# Patient Record
Sex: MALE | Race: WHITE | NOT HISPANIC OR LATINO | URBAN - METROPOLITAN AREA
[De-identification: names, ages, dates, MRNs, and addresses within clinical notes are randomized per-mention and may not be internally consistent; named-entity substitution may affect disease eponyms.]

---

## 2024-02-06 ENCOUNTER — EMERGENCY (EMERGENCY)
Facility: HOSPITAL | Age: 34
LOS: 1 days | Discharge: HOME | End: 2024-02-06
Attending: STUDENT IN AN ORGANIZED HEALTH CARE EDUCATION/TRAINING PROGRAM | Admitting: STUDENT IN AN ORGANIZED HEALTH CARE EDUCATION/TRAINING PROGRAM
Payer: COMMERCIAL

## 2024-02-06 VITALS
WEIGHT: 160.06 LBS | HEART RATE: 98 BPM | SYSTOLIC BLOOD PRESSURE: 145 MMHG | OXYGEN SATURATION: 98 % | DIASTOLIC BLOOD PRESSURE: 89 MMHG | TEMPERATURE: 98 F | RESPIRATION RATE: 20 BRPM | HEIGHT: 72 IN

## 2024-02-06 DIAGNOSIS — N50.82 SCROTAL PAIN: ICD-10-CM

## 2024-02-06 DIAGNOSIS — F17.290 NICOTINE DEPENDENCE, OTHER TOBACCO PRODUCT, UNCOMPLICATED: ICD-10-CM

## 2024-02-06 DIAGNOSIS — S30.853A: ICD-10-CM

## 2024-02-06 LAB
ANION GAP SERPL CALC-SCNC: 10 MMOL/L — SIGNIFICANT CHANGE UP (ref 5–17)
APTT BLD: 29.9 SEC — SIGNIFICANT CHANGE UP (ref 24.5–35.6)
BASOPHILS # BLD AUTO: 0.04 K/UL — SIGNIFICANT CHANGE UP (ref 0–0.2)
BASOPHILS NFR BLD AUTO: 0.3 % — SIGNIFICANT CHANGE UP (ref 0–2)
BUN SERPL-MCNC: 14 MG/DL — SIGNIFICANT CHANGE UP (ref 7–23)
CALCIUM SERPL-MCNC: 8.2 MG/DL — LOW (ref 8.4–10.5)
CHLORIDE SERPL-SCNC: 103 MMOL/L — SIGNIFICANT CHANGE UP (ref 96–108)
CO2 SERPL-SCNC: 27 MMOL/L — SIGNIFICANT CHANGE UP (ref 22–31)
CREAT SERPL-MCNC: 0.79 MG/DL — SIGNIFICANT CHANGE UP (ref 0.5–1.3)
EGFR: 119 ML/MIN/1.73M2 — SIGNIFICANT CHANGE UP
EOSINOPHIL # BLD AUTO: 0.14 K/UL — SIGNIFICANT CHANGE UP (ref 0–0.5)
EOSINOPHIL NFR BLD AUTO: 1.2 % — SIGNIFICANT CHANGE UP (ref 0–6)
GLUCOSE SERPL-MCNC: 85 MG/DL — SIGNIFICANT CHANGE UP (ref 70–99)
HCT VFR BLD CALC: 44.2 % — SIGNIFICANT CHANGE UP (ref 39–50)
HGB BLD-MCNC: 15.3 G/DL — SIGNIFICANT CHANGE UP (ref 13–17)
IMM GRANULOCYTES NFR BLD AUTO: 0.4 % — SIGNIFICANT CHANGE UP (ref 0–0.9)
INR BLD: 0.92 — SIGNIFICANT CHANGE UP (ref 0.85–1.18)
LYMPHOCYTES # BLD AUTO: 2.54 K/UL — SIGNIFICANT CHANGE UP (ref 1–3.3)
LYMPHOCYTES # BLD AUTO: 22 % — SIGNIFICANT CHANGE UP (ref 13–44)
MCHC RBC-ENTMCNC: 30.8 PG — SIGNIFICANT CHANGE UP (ref 27–34)
MCHC RBC-ENTMCNC: 34.6 GM/DL — SIGNIFICANT CHANGE UP (ref 32–36)
MCV RBC AUTO: 89.1 FL — SIGNIFICANT CHANGE UP (ref 80–100)
MONOCYTES # BLD AUTO: 0.5 K/UL — SIGNIFICANT CHANGE UP (ref 0–0.9)
MONOCYTES NFR BLD AUTO: 4.3 % — SIGNIFICANT CHANGE UP (ref 2–14)
NEUTROPHILS # BLD AUTO: 8.26 K/UL — HIGH (ref 1.8–7.4)
NEUTROPHILS NFR BLD AUTO: 71.8 % — SIGNIFICANT CHANGE UP (ref 43–77)
NRBC # BLD: 0 /100 WBCS — SIGNIFICANT CHANGE UP (ref 0–0)
PLATELET # BLD AUTO: 286 K/UL — SIGNIFICANT CHANGE UP (ref 150–400)
POTASSIUM SERPL-MCNC: 4.2 MMOL/L — SIGNIFICANT CHANGE UP (ref 3.5–5.3)
POTASSIUM SERPL-SCNC: 4.2 MMOL/L — SIGNIFICANT CHANGE UP (ref 3.5–5.3)
PROTHROM AB SERPL-ACNC: 10.5 SEC — SIGNIFICANT CHANGE UP (ref 9.5–13)
RBC # BLD: 4.96 M/UL — SIGNIFICANT CHANGE UP (ref 4.2–5.8)
RBC # FLD: 13.1 % — SIGNIFICANT CHANGE UP (ref 10.3–14.5)
SODIUM SERPL-SCNC: 140 MMOL/L — SIGNIFICANT CHANGE UP (ref 135–145)
WBC # BLD: 11.53 K/UL — HIGH (ref 3.8–10.5)
WBC # FLD AUTO: 11.53 K/UL — HIGH (ref 3.8–10.5)

## 2024-02-06 PROCEDURE — 85025 COMPLETE CBC W/AUTO DIFF WBC: CPT

## 2024-02-06 PROCEDURE — 72170 X-RAY EXAM OF PELVIS: CPT

## 2024-02-06 PROCEDURE — 80048 BASIC METABOLIC PNL TOTAL CA: CPT

## 2024-02-06 PROCEDURE — 72170 X-RAY EXAM OF PELVIS: CPT | Mod: 26

## 2024-02-06 PROCEDURE — 85610 PROTHROMBIN TIME: CPT

## 2024-02-06 PROCEDURE — 72193 CT PELVIS W/DYE: CPT | Mod: 26,MA

## 2024-02-06 PROCEDURE — 99285 EMERGENCY DEPT VISIT HI MDM: CPT

## 2024-02-06 PROCEDURE — 99284 EMERGENCY DEPT VISIT MOD MDM: CPT | Mod: 25

## 2024-02-06 PROCEDURE — 36415 COLL VENOUS BLD VENIPUNCTURE: CPT

## 2024-02-06 PROCEDURE — 76870 US EXAM SCROTUM: CPT | Mod: 26

## 2024-02-06 PROCEDURE — 76870 US EXAM SCROTUM: CPT

## 2024-02-06 PROCEDURE — 72193 CT PELVIS W/DYE: CPT | Mod: MA

## 2024-02-06 PROCEDURE — 85730 THROMBOPLASTIN TIME PARTIAL: CPT

## 2024-02-06 NOTE — ED PROVIDER NOTE - ATTENDING CONTRIBUTION TO CARE
Patient seen at bedside with MS4.    Well appearing, good vitals, +discrete soft but firm subcutaneous nodule to L proximal scrotum, minimally tender, some overlying erythema. About 2x3cm, oval-shaped. No rash/lesions over penis/scrotum/perineum otherwise. No crepitus/discoloration/bullae. No risk factors for necrotizing infection/Fabián's gangrene. Low suspicion for torsion. Unclear etiology of mass--possible ?tumor vs cyst. Will obtain US and consult urology for eval.

## 2024-02-06 NOTE — ED ADULT NURSE NOTE - CHIEF COMPLAINT QUOTE
Pt presents to ED here for L sided scrotal swelling and pain x 10 days. Pt went to  and was prescribed with abx. Pt denies any PMHx or urinary sx. Pt A&Ox4, conversive ion full sentences and [id number].

## 2024-02-06 NOTE — ED PROVIDER NOTE - NSFOLLOWUPINSTRUCTIONS_ED_ALL_ED_FT
You were seen in the Emergency Department for: scrotal pain    For pain, you may take Tylenol (acetaminophen) 975 mg every 6 hours, AND/OR Advil (ibuprofen) 600 mg every 8 hours.    Please follow up with your primary physician. If you do not have a primary physician or specialist of your needs, please call [phone] to find one convenient for you. At this number you will be able to locate a provider who accepts your insurance, as well as locate the right specialist for your needs.    You should return to the Emergency Department if you feel any new/worsening/persistent symptoms including but not limited to: fever, chills, vomiting, chest pain, difficulty breathing, loss of consciousness, bleeding, uncontrolled pain, numbness/weakness of a body part You were seen in the Emergency Department for: scrotal pain    For pain, you may take Tylenol (acetaminophen) 975 mg every 6 hours, AND/OR Advil (ibuprofen) 600 mg every 8 hours.    Please follow up with Dr. Doherty as discussed. If you do not have a primary physician or specialist of your needs, please call [phone] to find one convenient for you. At this number you will be able to locate a provider who accepts your insurance, as well as locate the right specialist for your needs.    You should return to the Emergency Department if you feel any new/worsening/persistent symptoms including but not limited to: fever, chills, vomiting, chest pain, difficulty breathing, loss of consciousness, bleeding, uncontrolled pain, numbness/weakness of a body part

## 2024-02-06 NOTE — CONSULT NOTE ADULT - SUBJECTIVE AND OBJECTIVE BOX
consult note:  HPI: 34 yo npestellax takes truvada for prep, presents to ER with approx one week of left testicular pain. Per patient he went to an urgent care and was diagnosed with epididymitis and started on levaquin, on monday the pain worsened and he developed redness of the scrotum, prompting his trip to the ER. Denies any fever, chills, abd or flank pain, urgency, frequency, dysuria, hematuria or difficulty emptying his bladder. Per patient he injected trimex 2 months ago into the shaft of his penis, he was unaware of any damage to the needle at that time.      Vital Signs Last 24 Hrs  T(C): 36.6 (06 Feb 2024 08:46), Max: 36.6 (06 Feb 2024 08:46)  T(F): 97.8 (06 Feb 2024 08:46), Max: 97.8 (06 Feb 2024 08:46)  HR: 98 (06 Feb 2024 08:46) (98 - 98)  BP: 145/89 (06 Feb 2024 08:46) (145/89 - 145/89)  BP(mean): --  RR: 20 (06 Feb 2024 08:46) (20 - 20)  SpO2: 98% (06 Feb 2024 08:46) (98% - 98%)    Parameters below as of 06 Feb 2024 08:46  Patient On (Oxygen Delivery Method): room air      I&O's Summary      PE:  Gen: NAD  Abd: soft, ND, NT  : uncircumcised phallus no blood or discharge at meatus, b/l descended testes nontender to palpation, one inch tender, erythematous nodule at the penile scrotal junction, soft, non fluctuant no crepitus, no open area of drainage      LABS:                        15.3   11.53 )-----------( 286      ( 06 Feb 2024 13:10 )             44.2             Cultures      A/P 34 yo m with scrotal foreign body  1) 10 mm foreign body visualized at penile scrotal junction on ultrasound, potentially borken needle from recent trimex injection  2) discussed with patient that he will need to have surgical intervention to remove the foreign body, patient under stands the risks of his current condition and wishes to be discharged and have the procedure at another location  3) rec labs, and CT pelvis with IV contrast to better visualize the foreign body  consult note:  HPI: 32 yo npestellax takes truvada for prep, presents to ER with approx one week of left testicular pain. Per patient he went to an urgent care and was diagnosed with epididymitis and started on levaquin, on monday the pain worsened and he developed redness of the scrotum, prompting his trip to the ER. Denies any fever, chills, abd or flank pain, urgency, frequency, dysuria, hematuria or difficulty emptying his bladder. Per patient he injected trimex 2 months ago into the shaft of his penis, he was unaware of any damage to the needle at that time.      Vital Signs Last 24 Hrs  T(C): 36.6 (06 Feb 2024 08:46), Max: 36.6 (06 Feb 2024 08:46)  T(F): 97.8 (06 Feb 2024 08:46), Max: 97.8 (06 Feb 2024 08:46)  HR: 98 (06 Feb 2024 08:46) (98 - 98)  BP: 145/89 (06 Feb 2024 08:46) (145/89 - 145/89)  BP(mean): --  RR: 20 (06 Feb 2024 08:46) (20 - 20)  SpO2: 98% (06 Feb 2024 08:46) (98% - 98%)    Parameters below as of 06 Feb 2024 08:46  Patient On (Oxygen Delivery Method): room air      I&O's Summary      PE:  Gen: NAD  Abd: soft, ND, NT  : uncircumcised phallus no blood or discharge at meatus, b/l descended testes nontender to palpation, one inch tender, erythematous nodule at the penile scrotal junction, soft, non fluctuant no crepitus, no open area of drainage      LABS:                        15.3   11.53 )-----------( 286      ( 06 Feb 2024 13:10 )             44.2             Cultures      A/P 32 yo m with scrotal foreign body  1) 10 mm foreign body visualized at penile scrotal junction on ultrasound, potentially broken needle from recent trimex injection  2) discussed with patient that he will need to have surgical intervention to remove the foreign body, patient under stands the risks of his current condition and wishes to be discharged and have the procedure at another location  3) rec labs, and CT pelvis with IV contrast to better visualize the foreign body

## 2024-02-06 NOTE — ED PROVIDER NOTE - CARE PROVIDER_API CALL
Akash Doherty  Urology  [address]  [place] [address]  Phone: [phone]  Fax: [phone]  Follow Up Time: Urgent

## 2024-02-06 NOTE — ED PROVIDER NOTE - PATIENT PORTAL LINK FT
You can access the FollowMyHealth Patient Portal offered by [facility] by registering at the following website: [email or web address]. By joining [facility]’s FollowMyHealth portal, you will also be able to view your health information using other applications (apps) compatible with our system.

## 2024-02-06 NOTE — ED PROVIDER NOTE - OBJECTIVE STATEMENT
Patient is a 33 year old M who comes in with 10 days of penile pain. Patient first noticed the pain 10 days ago in the AM. He then went to Urgent Care where he was prescribed Levofloxacin. The Antibiotics provided minimal relief to the size or pain of the mass. Yesterday, patient spent all day in the OR (patient is  rep for Spine Surgery) and noticed the area turning a different color and getting larger. Patient denies any issues in urinating, no fevers, chills, skin rashes, penile discharge. He recently had a STD panel which was negative. Patient has mild lower abdominal cramping which starting this morning. The pain is worse when he is moving, decreases when he is laying down resting. Tylenol has minimally helped the pain.

## 2024-02-06 NOTE — ED PROVIDER NOTE - PHYSICAL EXAMINATION
T(C): 36.6 (02-06-24 @ 08:46), Max: 36.6 (02-06-24 @ 08:46)  HR: 98 (02-06-24 @ 08:46) (98 - 98)  BP: 145/89 (02-06-24 @ 08:46) (145/89 - 145/89)  RR: 20 (02-06-24 @ 08:46) (20 - 20)  SpO2: 98% (02-06-24 @ 08:46) (98% - 98%)    CONSTITUTIONAL: Well groomed, no apparent distress  CV: RRR, +S1S2, no MRG; no JVD; no peripheral edema  GI: Soft, NT, ND, no rebound, no guarding; no palpable masses; no hepatosplenomegaly; no hernia palpated  : non tender, discrete, skin colored, hard mass palpated on penile shaft base. No bruising noted

## 2024-02-06 NOTE — ED ADULT NURSE NOTE - NSFALLUNIVINTERV_ED_ALL_ED
Bed/Stretcher in lowest position, wheels locked, appropriate side rails in place/Call bell, personal items and telephone in reach/Instruct patient to call for assistance before getting out of bed/chair/stretcher/Non-slip footwear applied when patient is off stretcher/[place] to call system/Physically safe environment - no spills, clutter or unnecessary equipment/Purposeful proactive rounding/Room/bathroom lighting operational, light cord in reach

## 2024-02-06 NOTE — ED PROVIDER NOTE - CLINICAL SUMMARY MEDICAL DECISION MAKING FREE TEXT BOX
Patient is a 33 year old M who comes in with 10 days of penile pain. Levofloxacin for 10 days did not provide relief nor decrease of size. Patient denies any issues in urinating, no fevers, chills, skin rashes, penile discharge. Physical exam shows non tender, discrete, skin colored, hard mass palpated on penile shaft base. No bruising noted. Gen Surgery consulted, most likely not a hernia. Ultrasound ordered for imaging of possible mass. Urology consulted Patient is a 33 year old M who comes in with 10 days of penile pain. Levofloxacin for 10 days did not provide relief nor decrease of size. Patient denies any issues in urinating, no fevers, chills, skin rashes, penile discharge. Physical exam shows non tender, discrete, skin colored, soft/firm mass palpated on penile shaft base/prox scrotum. No bruising noted. Seen by gen surg resident, cleared from surgery perspective, not a hernia. Ultrasound ordered for imaging of possible mass. Urology to nevaeh.

## 2024-02-06 NOTE — ED ADULT NURSE NOTE - OBJECTIVE STATEMENT
Patient presents to ER AOx3, speaking in full sentences, ambulating without difficulty sent for eval of left sided testicular swelling x1week. patient was seen by [facility], prescribed abx without relief. Denies trauma/injury. rates pain 5-8/10. denies pmhx. nkda.

## 2024-02-06 NOTE — ED PROVIDER NOTE - PROGRESS NOTE DETAILS
Donnell Alcantara MD: US showing ?foreign body, patient seen by urology, likely will need operative removal--patient not wanting any procedure performed here given he works here--would prefer to f/u @ [facility]. Per urology PA--requesting preop labs and CT pelvis with IV contrast, then can f/u outpatient.

## 2024-02-06 NOTE — ED ADULT TRIAGE NOTE - CHIEF COMPLAINT QUOTE
Pt presents to ED here for L sided scrotal swelling and pain x 10 days. Pt went to  and was prescribed with abx. Pt denies any PMHx or urinary sx. Pt A&Ox4, conversive ion full sentences and [id number].

## 2024-02-07 ENCOUNTER — TRANSCRIPTION ENCOUNTER (OUTPATIENT)
Age: 34
End: 2024-02-07

## 2024-02-07 ENCOUNTER — APPOINTMENT (OUTPATIENT)
Dept: UROLOGY | Facility: CLINIC | Age: 34
End: 2024-02-07
Payer: COMMERCIAL

## 2024-02-07 ENCOUNTER — NON-APPOINTMENT (OUTPATIENT)
Age: 34
End: 2024-02-07

## 2024-02-07 VITALS
WEIGHT: 165 LBS | OXYGEN SATURATION: 96 % | TEMPERATURE: 98.3 F | HEIGHT: 72 IN | HEART RATE: 97 BPM | SYSTOLIC BLOOD PRESSURE: 118 MMHG | BODY MASS INDEX: 22.35 KG/M2 | DIASTOLIC BLOOD PRESSURE: 83 MMHG

## 2024-02-07 DIAGNOSIS — Z78.9 OTHER SPECIFIED HEALTH STATUS: ICD-10-CM

## 2024-02-07 PROBLEM — Z00.00 ENCOUNTER FOR PREVENTIVE HEALTH EXAMINATION: Status: ACTIVE | Noted: 2024-02-07

## 2024-02-07 PROCEDURE — 99204 OFFICE O/P NEW MOD 45 MIN: CPT

## 2024-02-07 RX ORDER — SULFAMETHOXAZOLE AND TRIMETHOPRIM 800; 160 MG/1; MG/1
800-160 TABLET ORAL
Qty: 20 | Refills: 0 | Status: ACTIVE | COMMUNITY
Start: 2024-02-07 | End: 1900-01-01

## 2024-02-07 NOTE — PHYSICAL EXAM
[General Appearance - Well Developed] : well developed [General Appearance - Well Nourished] : well nourished [Heart Rate And Rhythm] : heart rate and rhythm were normal [] : no respiratory distress [Respiration, Rhythm And Depth] : normal respiratory rhythm and effort [Abdomen Soft] : soft [Abdomen Tenderness] : non-tender [Costovertebral Angle Tenderness] : no ~M costovertebral angle tenderness [Urethral Meatus] : meatus normal [Penis Abnormality] : normal circumcised penis [Epididymis] : the epididymides were normal [Testes Tenderness] : no tenderness of the testes [Testes Mass (___cm)] : there were no testicular masses [de-identified] : left superior scrotal wall indurated and mildly fluctuant.

## 2024-02-07 NOTE — ASSESSMENT
[FreeTextEntry1] : 34 yo male with scrotal wall abscess.  This was aspirated with small volume purulence.  I will send this for wound culture.  I will start him on Bactrim DS.  If the abscess reaccumulates then we will perform an I&D in the office.

## 2024-02-07 NOTE — HISTORY OF PRESENT ILLNESS
[FreeTextEntry1] : 34 yo male with 1-2 weeks of scrotal wall swelling.  He was seen at urgent care and started on abx.  This seemed to help but the swelling returned yesterday which was associated with pain.  He was seen at the [facility] ED where he underwent scrotal US.  US showed a phlegmon possible early abscess with a possible foreign body.  This was followed with a pelvic XR and then a CT scan.  The CT did not show any foreign object.  It did show soft tissue inflammation in the area of the superior left hemiscrotum.     Left scrotal wall abscess was aspirated with a 21-gauge needle.  1-2 cc of purulent material was aspirated and sent for wound cx

## 2024-02-09 ENCOUNTER — APPOINTMENT (OUTPATIENT)
Dept: UROLOGY | Facility: CLINIC | Age: 34
End: 2024-02-09

## 2024-02-12 ENCOUNTER — TRANSCRIPTION ENCOUNTER (OUTPATIENT)
Age: 34
End: 2024-02-12

## 2024-02-12 ENCOUNTER — NON-APPOINTMENT (OUTPATIENT)
Age: 34
End: 2024-02-12

## 2024-02-12 RX ORDER — AMOXICILLIN AND CLAVULANATE POTASSIUM 875; 125 MG/1; MG/1
875-125 TABLET, COATED ORAL
Qty: 20 | Refills: 0 | Status: ACTIVE | COMMUNITY
Start: 2024-02-12 | End: 1900-01-01

## 2024-02-13 LAB — BACTERIA FLD CULT: ABNORMAL

## 2024-02-21 ENCOUNTER — APPOINTMENT (OUTPATIENT)
Dept: UROLOGY | Facility: CLINIC | Age: 34
End: 2024-02-21
Payer: COMMERCIAL

## 2024-02-21 DIAGNOSIS — N49.2 INFLAMMATORY DISORDERS OF SCROTUM: ICD-10-CM

## 2024-02-21 PROCEDURE — 99213 OFFICE O/P EST LOW 20 MIN: CPT

## 2024-02-22 PROBLEM — N49.2 SCROTAL WALL ABSCESS: Status: ACTIVE | Noted: 2024-02-07

## 2024-02-22 NOTE — ASSESSMENT
[FreeTextEntry1] : 34 yo male with resolving scrotal wall abscess.  Will continue to observe him for now.  He will return for follow up in 1 month.

## 2024-02-22 NOTE — PHYSICAL EXAM
[General Appearance - Well Developed] : well developed [General Appearance - Well Nourished] : well nourished [Heart Rate And Rhythm] : heart rate and rhythm were normal [Abdomen Soft] : soft [] : no respiratory distress [Urethral Meatus] : meatus normal [Penis Abnormality] : normal circumcised penis [Normal Station and Gait] : the gait and station were normal for the patient's age [No Focal Deficits] : no focal deficits [Skin Color & Pigmentation] : normal skin color and pigmentation [Not Anxious] : not anxious [Oriented To Time, Place, And Person] : oriented to person, place, and time [de-identified] : Much improved scrotal induration.  No tenderness or erythema on exam.  No fluctuance appreciated.

## 2024-02-22 NOTE — HISTORY OF PRESENT ILLNESS
[FreeTextEntry1] : 2/7/24: 32 yo male with 1-2 weeks of scrotal wall swelling.  He was seen at urgent care and started on abx.  This seemed to help but the swelling returned yesterday which was associated with pain.  He was seen at the [facility] ED where he underwent scrotal US.  US showed a phlegmon possible early abscess with a possible foreign body.  This was followed with a pelvic XR and then a CT scan.  The CT did not show any foreign object.  It did show soft tissue inflammation in the area of the superior left hemiscrotum.     Left scrotal wall abscess was aspirated with a 21-gauge needle.  1-2 cc of purulent material was aspirated and sent for wound cx  **************** 2/21/24: Patient returns for follow up. He has completed his course of Augmentin which was culture specific based on wound cx results.  He feels much improved.  There is no more scrotal wall tenderness.  Most of the induration has resolved.  There does not appear to be any foreign object on exam that was suggested on ED scrotal US.  He denies any fevers or chills.

## 2024-02-23 ENCOUNTER — TRANSCRIPTION ENCOUNTER (OUTPATIENT)
Age: 34
End: 2024-02-23
